# Patient Record
Sex: FEMALE
[De-identification: names, ages, dates, MRNs, and addresses within clinical notes are randomized per-mention and may not be internally consistent; named-entity substitution may affect disease eponyms.]

---

## 2017-10-30 ENCOUNTER — FORM ENCOUNTER (OUTPATIENT)
Age: 57
End: 2017-10-30

## 2019-03-18 ENCOUNTER — FORM ENCOUNTER (OUTPATIENT)
Age: 59
End: 2019-03-18

## 2022-01-14 ENCOUNTER — NON-APPOINTMENT (OUTPATIENT)
Age: 62
End: 2022-01-14

## 2022-01-14 PROBLEM — Z00.00 ENCOUNTER FOR PREVENTIVE HEALTH EXAMINATION: Status: ACTIVE | Noted: 2022-01-14

## 2022-01-20 ENCOUNTER — APPOINTMENT (OUTPATIENT)
Dept: BREAST CENTER | Facility: CLINIC | Age: 62
End: 2022-01-20
Payer: COMMERCIAL

## 2022-01-20 ENCOUNTER — NON-APPOINTMENT (OUTPATIENT)
Age: 62
End: 2022-01-20

## 2022-01-20 VITALS
SYSTOLIC BLOOD PRESSURE: 121 MMHG | WEIGHT: 157 LBS | BODY MASS INDEX: 23.79 KG/M2 | HEIGHT: 68 IN | HEART RATE: 51 BPM | TEMPERATURE: 97.6 F | DIASTOLIC BLOOD PRESSURE: 74 MMHG | OXYGEN SATURATION: 100 %

## 2022-01-20 DIAGNOSIS — R92.2 INCONCLUSIVE MAMMOGRAM: ICD-10-CM

## 2022-01-20 DIAGNOSIS — Z78.9 OTHER SPECIFIED HEALTH STATUS: ICD-10-CM

## 2022-01-20 PROCEDURE — 99213 OFFICE O/P EST LOW 20 MIN: CPT

## 2022-01-20 RX ORDER — ESTRADIOL 1.25 MG/1.25G
GEL TOPICAL
Refills: 0 | Status: ACTIVE | COMMUNITY

## 2022-01-20 NOTE — PHYSICAL EXAM
[Normocephalic] : normocephalic [Examined in the supine and seated position] : examined in the supine and seated position [Symmetrical] : symmetrical [No dominant masses] : no dominant masses in right breast  [No dominant masses] : no dominant masses left breast [No Nipple Retraction] : no left nipple retraction [No Nipple Discharge] : no left nipple discharge [No Axillary Lymphadenopathy] : no left axillary lymphadenopathy

## 2022-01-30 NOTE — ASSESSMENT
[FreeTextEntry1] : Patient presents with is a strong family history of breast cancer, lifetime GREGG risk of 35.5%. Discussed with the patient the implications for her lifetime risk, which is considered to be at high risk to develop breast cancer over the span of her lifetime and it is recommended that she undergoes high risk screening surveillance to include biannual radiological screening exams with a mammogram and screening MRI. \par \par Discussed recommendation to proceed with breast MRI in July 2022 and can follow up same day. \par \par Advised given her family history and high risk status, and prolonged use of topical estrogen, recommend to have a discussion with OBGYN to possibly discontinue. \par \par Discussed importance and implication of genetic testing in regards to her family history and offspring. Patient would like to go forward with genetic counselor consult.\par \par All patient questions were answered; she verbalized understanding of the information and plan of care.\par

## 2022-01-30 NOTE — HISTORY OF PRESENT ILLNESS
[FreeTextEntry1] : 61 year old female previously under the care of Dr. Charli Castro, last evaluated 3/2019 presents for high risk screening. 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 She has family history of breast cancer, her mother was diagnosed at age 45, no genetic testing. She note she can palpate lymph nodes in her right axillary/axillary tail region and left "lumpiness" in the UOQ states she suspects it is scar tissue from her surgery.  Denies skin changes/dimpling, no nipple discharge bilaterally. \par \par GREGG lifetime risk of 35.5%\par \par She is on topical HRT, Divigel (1mg of Estradiol) (has been on for at least 10 years) applies to thigh. \par \par Of note, patient has a thoracic aneurysm, undergoing surveillance.

## 2022-01-30 NOTE — REASON FOR VISIT
[Consultation] : a consultation visit [FreeTextEntry1] : high risk screening, history of left ADH, family history of breast cancer, GREGG of 35.5%

## 2022-01-30 NOTE — DATA REVIEWED
[FreeTextEntry1] : 3/19/2019 ([facility]) bilateral screening mammogram showing breasts are heterogeneously dense, numerous bilateral cysts. No mammographic, tomographic or sonographic evidence of malignancy.\par FOLLOW-UP: Follow-up imaging in 12 months. Recommend annual clinical breast examination, and mammography, sooner if clinically indicated. ASSESSMENT: BI-RADS Category 2: Benign.\par \par 1/20/2022 ([facility]) bilateral mammogram/US showing breasts are heterogeneously dense, Right breast, 3:00 axis, 6 cm from the nipple, scar identified, Right breast, 10:00, 5 cm from the nipple, stable 6 mm benign-appearing lymph node, Right axilla, area of palpable concern, normal appearing normal-sized lymph node with a normal cortex. multiple bilateral waxing and waning simple and minimally complicated cysts.No mammographic or ultrasonographic evidence of malignancy. Benign BIRADS 2 \par

## 2022-01-30 NOTE — PAST MEDICAL HISTORY
[Menarche Age ____] : age at menarche was [unfilled] [Definite ___ (Date)] : the last menstrual period was [unfilled] [Total Preg ___] : G[unfilled] [Abortions ___] : Abortions:[unfilled] [Menopause Age____] : age at menopause was [unfilled] [Living ___] : Living: [unfilled] [AB Spont ___] : miscarriages: [unfilled]  [FreeTextEntry5] : hysterectomy (uterine fibroids) in 2006 ovaries retained  [FreeTextEntry7] : none [FreeTextEntry8] : none

## 2022-07-12 ENCOUNTER — APPOINTMENT (OUTPATIENT)
Dept: BREAST CENTER | Facility: CLINIC | Age: 62
End: 2022-07-12

## 2022-07-12 VITALS
SYSTOLIC BLOOD PRESSURE: 129 MMHG | BODY MASS INDEX: 23.51 KG/M2 | DIASTOLIC BLOOD PRESSURE: 81 MMHG | HEIGHT: 68 IN | HEART RATE: 66 BPM | WEIGHT: 155.13 LBS

## 2022-07-12 DIAGNOSIS — Z78.9 OTHER SPECIFIED HEALTH STATUS: ICD-10-CM

## 2022-07-12 PROCEDURE — 99213 OFFICE O/P EST LOW 20 MIN: CPT

## 2022-07-12 RX ORDER — PHENAZOPYRIDINE HYDROCHLORIDE 200 MG/1
200 TABLET ORAL
Qty: 1 | Refills: 0 | Status: ACTIVE | COMMUNITY
Start: 2022-07-06

## 2022-07-12 RX ORDER — NIRMATRELVIR AND RITONAVIR 300-100 MG
20 X 150 MG & KIT ORAL
Qty: 30 | Refills: 0 | Status: ACTIVE | COMMUNITY
Start: 2022-05-11

## 2022-07-15 NOTE — DATA REVIEWED
[FreeTextEntry1] : 3/19/2019 (R) bilateral screening mammogram showing breasts are heterogeneously dense, numerous bilateral cysts. No mammographic, tomographic or sonographic evidence of malignancy.\par FOLLOW-UP: Follow-up imaging in 12 months. Recommend annual clinical breast examination, and mammography, sooner if clinically indicated. ASSESSMENT: BI-RADS Category 2: Benign.\par \par 1/20/2022 ([facility]) bilateral mammogram/US showing breasts are heterogeneously dense, Right breast, 3:00 axis, 6 cm from the nipple, scar identified, Right breast, 10:00, 5 cm from the nipple, stable 6 mm benign-appearing lymph node, Right axilla, area of palpable concern, normal appearing normal-sized lymph node with a normal cortex. multiple bilateral waxing and waning simple and minimally complicated cysts.No mammographic or ultrasonographic evidence of malignancy. Benign BIRADS 2 \par \par 7/12/22 ([facility]) B/L MRI: No MR evidence of malignancy. [id number]: Benign \par \par

## 2022-07-15 NOTE — HISTORY OF PRESENT ILLNESS
[FreeTextEntry1] : 61 year old female presents for 6 month follow up for high risk screening. Of note, patient previously followed by Dr. Castro. Patient has a history of left ADH s/p left breast excisional biopsy in 2006. She has family history of breast cancer, her mother was diagnosed at age 45, no genetic testing. Denies family history of ovarian cancer. B/L breast MRI performed today, BIRADS-2. \par \par Patient was referred to genetic counselor at [facility] 1/20/22. Patient decided not to follow through, patient unsure if she is a candidate for genetic testing.  \par \par She note she can palpate lymph nodes in her right axillary/axillary tail region and left "lumpiness" in the UOQ states she suspects it is scar tissue from her surgery.  Denies skin changes/dimpling, no nipple discharge bilaterally. \par \par She is on topical HRT, Divigel (1mg of Estradiol) (has been on for at least 10 years) applies to thigh but is weaning off of it now taking once every 5 days as opposed to daily. At [facility] 1/20/22, advised patient to have a discussion with OBGYN to possibly discontinue given patient's high risk factors, considering switch to synthetic po Evista. Of note, patient has a thoracic aneurysm, undergoing surveillance. \par \par Patient has had partial hysterectomy and now plans to have have ovarian cyst removed along with ovary and fallopian tube removal in August 2022. \par \par GREGG lifetime risk of 35.5%

## 2022-07-15 NOTE — ASSESSMENT
[FreeTextEntry1] : 61 year old female presents for 6 month follow up for high risk screening. Patient has a history of left ADH s/p left breast excisional biopsy in 2006. She has family history of breast cancer, her mother was diagnosed at age 45, no genetic testing. Patient is weaning off of topical HRT. Discussed recommendation for use of po Evista given osteoporosis benefit, also discussed option of Tamoxifen. \par \par Patient was referred to genetic counselor at [facility] 1/20/22. Discussed plan for mother to have genetic testing performed. \par \par Patient without complaint. Patient with difficult physical    dense breast tissue bilaterally. Most recent imaging reviewed, B/L MRI 7/12/22, BIRADS-2. \par \par Lifetime GREGG risk of 35.5%. Discussed with the patient the implications for her lifetime risk, which is considered to be at high risk to develop breast cancer over the span of her lifetime and it is recommended that she undergoes high risk screening surveillance to include biannual radiological screening exams with a mammogram and screening MRI. Plan for B/L sMMG/US and re-examination in January 2023. Patient verbalizes understanding and is in agreement with the plan.\par \par \par

## 2022-07-15 NOTE — PHYSICAL EXAM
[Supple] : supple [No Supraclavicular Adenopathy] : no supraclavicular adenopathy [Examined in the supine and seated position] : examined in the supine and seated position [Symmetrical] : symmetrical [No dominant masses] : no dominant masses in right breast  [No dominant masses] : no dominant masses left breast [No Nipple Retraction] : no left nipple retraction [No Nipple Discharge] : no left nipple discharge [No Axillary Lymphadenopathy] : no left axillary lymphadenopathy [de-identified] : dense breast tissue bilaterally

## 2022-07-15 NOTE — REASON FOR VISIT
[Follow-Up: _____] : a [unfilled] follow-up visit [FreeTextEntry1] : high risk screening, history of left ADH, family history of breast cancer, GREGG of 35.5%

## 2022-07-15 NOTE — PAST MEDICAL HISTORY
[Menarche Age ____] : age at menarche was [unfilled] [Menopause Age____] : age at menopause was [unfilled] [Definite ___ (Date)] : the last menstrual period was [unfilled] [Total Preg ___] : G[unfilled] [Abortions ___] : Abortions:[unfilled] [Living ___] : Living: [unfilled] [AB Spont ___] : miscarriages: [unfilled]  [History of Hormone Replacement Treatment] : has a history of hormone replacement treatment [FreeTextEntry5] : hysterectomy (uterine fibroids) in 2006 ovaries retained  [FreeTextEntry6] : yes [FreeTextEntry7] : none [FreeTextEntry8] : none

## 2023-12-20 ENCOUNTER — NON-APPOINTMENT (OUTPATIENT)
Age: 63
End: 2023-12-20

## 2023-12-26 ENCOUNTER — APPOINTMENT (OUTPATIENT)
Dept: BREAST CENTER | Facility: CLINIC | Age: 63
End: 2023-12-26

## 2024-01-23 ENCOUNTER — APPOINTMENT (OUTPATIENT)
Dept: HEMATOLOGY ONCOLOGY | Facility: CLINIC | Age: 64
End: 2024-01-23

## 2024-01-23 ENCOUNTER — APPOINTMENT (OUTPATIENT)
Dept: BREAST CENTER | Facility: CLINIC | Age: 64
End: 2024-01-23
Payer: COMMERCIAL

## 2024-01-23 VITALS
SYSTOLIC BLOOD PRESSURE: 115 MMHG | WEIGHT: 147 LBS | DIASTOLIC BLOOD PRESSURE: 66 MMHG | HEIGHT: 68 IN | HEART RATE: 54 BPM | BODY MASS INDEX: 22.28 KG/M2

## 2024-01-23 DIAGNOSIS — R73.03 PREDIABETES.: ICD-10-CM

## 2024-01-23 DIAGNOSIS — Z91.89 OTHER SPECIFIED PERSONAL RISK FACTORS, NOT ELSEWHERE CLASSIFIED: ICD-10-CM

## 2024-01-23 DIAGNOSIS — Z80.3 FAMILY HISTORY OF MALIGNANT NEOPLASM OF BREAST: ICD-10-CM

## 2024-01-23 DIAGNOSIS — Z87.42 PERSONAL HISTORY OF OTHER DISEASES OF THE FEMALE GENITAL TRACT: ICD-10-CM

## 2024-01-23 PROCEDURE — 99213 OFFICE O/P EST LOW 20 MIN: CPT

## 2024-01-25 NOTE — DATA REVIEWED
[FreeTextEntry1] : 3/19/2019 ([facility]) bilateral screening mammogram showing breasts are heterogeneously dense, numerous bilateral cysts. No mammographic, tomographic or sonographic evidence of malignancy. FOLLOW-UP: Follow-up imaging in 12 months. Recommend annual clinical breast examination, and mammography, sooner if clinically indicated. ASSESSMENT: BI-RADS Category 2: Benign.  1/20/2022 ([facility]) bilateral mammogram/US showing breasts are heterogeneously dense, Right breast, 3:00 axis, 6 cm from the nipple, scar identified, Right breast, 10:00, 5 cm from the nipple, stable 6 mm benign-appearing lymph node, Right axilla, area of palpable concern, normal appearing normal-sized lymph node with a normal cortex. multiple bilateral waxing and waning simple and minimally complicated cysts.No mammographic or ultrasonographic evidence of malignancy. Benign BIRADS 2   7/12/22 ([facility]) B/L MRI: No MR evidence of malignancy. [id number]: Benign   1/23/24 B/L sMMG/US ([facility])- heterogeneously dense.  Benign findings. No mammographic or ultrasonographic evidence of malignancy. No significant change. FOLLOW-UP: Annual screening. Patient appears to be due for high risk screening breast MRI at this time. BI-RADS 2:  Benign.

## 2024-01-25 NOTE — PAST MEDICAL HISTORY
[FreeTextEntry5] : hysterectomy (uterine fibroids) in 2006 ovaries retained  [FreeTextEntry6] : yes [FreeTextEntry7] : none [FreeTextEntry8] : none

## 2024-01-25 NOTE — ASSESSMENT
[FreeTextEntry1] : 63 year old female presents for annual follow up for high risk screening. Patient has a history of left ADH s/p left breast excisional biopsy in 2006. She has family history of breast cancer, her mother was diagnosed at age 45, no genetic testing. Patient is weaning off of topical HRT. Discussed recommendation for use of po Evista given osteoporosis benefit, also discussed option of Tamoxifen.  Discussed importance and implication of genetic testing in regards to her family history and offspring. Patient would like to go forward with genetic counselor consult.  Most recent imaging reviewed, B/L sMMG/US 1/23/24 BIRADS-2. Patient is overdue for breast MRI imaging.   Lifetime GREGG risk of 35.5%. Discussed imaging findings. Plan for B/L high risk MRI and re-examination in July 2024. Patient verbalizes understanding and agrees with the plan.

## 2024-01-25 NOTE — REASON FOR VISIT
[FreeTextEntry1] : high risk screening, history of left ADH, family history of breast cancer, GREGG of 35.5%

## 2024-01-25 NOTE — HISTORY OF PRESENT ILLNESS
[FreeTextEntry1] : 63 year old female presents for annual follow up for high risk screening. Of note, patient previously followed by Dr. Castro. Patient has a history of left ADH s/p left breast excisional biopsy in 2006. She has family history of breast cancer, her mother was diagnosed at age 45, no genetic testing. Denies family history of ovarian cancer.   B/L MRI  7/2022 BIRADS 2, patient overdue for breast MRI imaging. B/L sMMG/US 1/23/24 BIRADS-2  Patient was referred to genetic counselor at [facility] 1/20/22. Patient decided not to follow through, patient unsure if she is a candidate for genetic testing.    She note she can palpate lymph nodes in her right axillary/axillary tail region and left "lumpiness" in the UOQ states she suspects it is scar tissue from her surgery.  Denies skin changes/dimpling, no nipple discharge bilaterally.   She is on topical HRT, Divigel (1mg of Estradiol) (has been on for at least 10 years) applies to thigh but is weaning off of it now taking once every 5 days as opposed to daily. At [facility] 1/20/22, advised patient to have a discussion with OBGYN to possibly discontinue given patient's high risk factors, considering switch to synthetic po Evista. Of note, patient has a thoracic aneurysm, undergoing surveillance.   Patient has had partial hysterectomy and now plans to have have ovarian cyst removed along with ovary and fallopian tube removal in August 2022.   GREGG lifetime risk of 35.5%

## 2024-01-30 NOTE — DISCUSSION/SUMMARY
[FreeTextEntry1] : REASON FOR CONSULT: Rolanda Qiu is a 63-year-old female referred by Dr. Charli Aquino for cancer genetic counseling and risk assessment due to a family history of cancer. Ms. Qiu was seen on 2024 at which time medical and family history was ascertained and a pedigree constructed. Genetic counseling , Shayla Tsang, also participated in this session.   RELEVANT MEDICAL HISTORY: Ms. Qiu is a healthy individual with no reported history of cancer. She has a family history of cancer, see below. She does have a history of left breast atypical ductal hyperplasia that was excised in .  OTHER MEDICAL AND SURGICAL HISTORY: -	Medical History: no significant medical history reported -	Surgical History: hysterectomy  d/t fibroids, bilateral salpingo-oophorectomy  d/t cysts  OB/GYN HISTORY: Obstetrical History:  (has 2 adopted children) Age at Menarche: 10 Menopausal Status: Post-menopausal with LMP at age 50 d/t hysterectomy Age at First Live Birth: N/A Oral Contraceptive Use: yes, 3 years Hormone Replacement Therapy: Yes, vaginal estradiol, currently weaning off   CANCER SCREENING HISTORY:   Breast:  -	Mammography: 24- wnl -	Sonography: 24- wnl -	MRI: 22- wnl -	Biopsies: yes, - ADH GYN: -	Pelvic Examination: Annual- reportedly wnl, s/p MURALI/BSO Colon: -	Colonoscopy: - reported no history of polyps, repeating every 7 years Skin:   -	FBSE: Yes -	Lesions biopsied/removed: yes- sebaceous cyst  SOCIAL HISTORY: -	Tobacco-product use: Yes, former- 10 years -	Environmental exposures: No   FAMILY HISTORY: Maternal ancestry was reported as Frisian/Pashto and paternal ancestry was reported as Pashto/Sudanese. Ashkenazi Roman Catholic ancestry was denied. A detailed family history of cancer was ascertained, see below and scanned chart for pedigree.   According to Ms. Qiu no one in the family has had germline testing for cancer susceptibility. Consanguinity was denied.  	 RISK ASSESSMENT: Ms. Qiu's personal and family history is suggestive of a hereditary cancer syndrome given her mother's history of breast cancer at age 45 and maternal grandfather with a metastatic cancer of either stomach or colon primary in his 50s. The patient meets National Comprehensive Cancer Network (NCCN) criteria for genetic testing. We recommended genetic testing using [facility] test. This test analyzes 48 genes: DAYNE, APC, AXIN2, BAP1, BARD1, BMPR1A, BRCA1, BRCA2, BRIP1, CDH1, CDK4, CDKN2A, CHEK2, CTNNA1, EGFR, EPCAM, FH, FLCN, GREM1, HOXB13, MEN1, MET, MITF, MLH1, MSH2, MSH3, MSH6, MUTYH, NF1, NTLH1, PALB2, POLD1, POLE, PMS2, PTEN, RAD51C, RAD51D, RET, SDHA, SDHB, SDHC, SDHD SMAD4, STK11, TERT, TSC1, TSC2, TP53 and VHL.  The risks, benefits and limitations of genetic testing were discussed with Ms. Qiu. In addition, we discussed the purpose of genetic testing and possible test results (positive, negative, inconclusive) along with associated medical management options and psychosocial implications. Insurance coverage and potential out of pocket costs were also discussed. The Genetic Information Non-discrimination Act (TREASURE) was reviewed.  It was explained that risk assessment is based upon medical and family history as provided and may change in the future should new information be obtained.   Following our discussion, Ms. Qiu consented to the above-mentioned genetic testing panel. Blood was drawn in our laboratory and sent to [facility] today.  PLAN:  1.	Blood drawn today will be sent to [facility] for analysis.  2.	We will contact Ms. Qiu to schedule a follow-up appointment once the results are available. Results generally return in 2-3 weeks.   For any additional questions please call Cancer Genetics at [phone].    Sintia Wolfe MS, [facility] [place] Genetic Counselor, Cancer Genetics

## 2024-02-01 ENCOUNTER — NON-APPOINTMENT (OUTPATIENT)
Age: 64
End: 2024-02-01

## 2024-02-01 NOTE — DISCUSSION/SUMMARY
[FreeTextEntry1] : RESULTS TRANSMISSION:   Rolanda Qiu is a 63-year-old female who was contacted on February 1, 2024 for a discussion regarding her negative genetic testing results related to hereditary cancer predisposition. This session was conducted via telephone.   Ms. Qiu was originally seen by the Cancer Genetics Service on January 23, 2024 for hereditary cancer predisposition risk assessment due to a family history of cancer. At that time, Ms. Qiu decided to pursue genetic testing using [facility] panel.   TEST RESULTS: NEGATIVE NO pathogenic (disease-causing) variants or variants of uncertain significance were detected in any of the following genes [48]:  DAYNE, APC, AXIN2, BAP1, BARD1, BMPR1A, BRCA1, BRCA2, BRIP1, CDH1, CDK4, CDKN2A, CHEK2, CTNNA1, EGFR, EPCAM, FH, FLCN, GREM1, HOXB13, MEN1, MET, MITF, MLH1, MSH2, MSH3, MSH6, MUTYH, NF1, NTLH1, PALB2, POLD1, POLE, PMS2, PTEN, RAD51C, RAD51D, RET, SDHA, SDHB, SDHC, SDHD SMAD4, STK11, TERT, TSC1, TSC2, TP53 and VHL.  RESULTS INTERPRETATION AND ASSESSMENT: Given Ms. Qiu's personal and current reported family history of cancer, and her negative genetic test results, the following screening guidelines and risk-reducing recommendations were discussed:  BREAST:  -	Given patient's history of atypical ductal hyperplasia and family history of breast cancer, we recommended she continue screening as per her discussions with her breast care team.   OTHER: -	In the absence of other indications, Ms. Qiu should practice age-appropriate cancer screening of other organ systems as recommended for the general population.  We also discussed that, while no cause of the patient's personal and family history of cancer was identified, this result, while reassuring, does entirely not rule out a hereditary cancer risk in the patient. It is possible, although unlikely, the patient has a mutation in one of the genes tested that is not detectable by this analysis, or has a mutation in a different gene, either known or unknown. It is also possible there is a hereditary cancer predisposition in the family, but the patient did not inherit it.   We informed Ms. Qiu that our knowledge of genetics and inherited cancer conditions is changing rapidly. Therefore, we recommended that Ms. Qiu contact our office, every 2 to 3 years, to discuss relevant advances in cancer genetics.  We emphasized the importance of re-contacting us with updates regarding her personal and family history of cancer as well as any updates regarding additional cancer genetic test results performed for the patient and/or family members.  Such updates could possibly change our risk assessment and recommendations.   PLAN: 1.	These results do not change Ms. Qiu's medical management.  2.	Patient informed consult note(s) will be available through their [facility] patient portal and genetic test results will be mailed to patient. 3.	Ms. Qiu was encouraged to contact us every 2-3 years to discuss relevant advances in cancer genetics, or sooner if there are any changes in her personal or family history of cancer.   For any additional questions please call Cancer Genetics at [phone].    Sintia Wolfe MS, [facility] [place] Genetic Counselor, Cancer Genetics

## 2025-01-28 ENCOUNTER — APPOINTMENT (OUTPATIENT)
Dept: BREAST CENTER | Facility: CLINIC | Age: 65
End: 2025-01-28

## 2025-01-28 VITALS
BODY MASS INDEX: 23.04 KG/M2 | HEART RATE: 74 BPM | SYSTOLIC BLOOD PRESSURE: 146 MMHG | WEIGHT: 152 LBS | HEIGHT: 68 IN | DIASTOLIC BLOOD PRESSURE: 72 MMHG

## 2025-01-28 DIAGNOSIS — Z80.3 FAMILY HISTORY OF MALIGNANT NEOPLASM OF BREAST: ICD-10-CM

## 2025-01-28 DIAGNOSIS — R92.8 OTHER ABNORMAL AND INCONCLUSIVE FINDINGS ON DIAGNOSTIC IMAGING OF BREAST: ICD-10-CM

## 2025-01-28 DIAGNOSIS — Z91.89 OTHER SPECIFIED PERSONAL RISK FACTORS, NOT ELSEWHERE CLASSIFIED: ICD-10-CM

## 2025-01-28 DIAGNOSIS — Z87.42 PERSONAL HISTORY OF OTHER DISEASES OF THE FEMALE GENITAL TRACT: ICD-10-CM

## 2025-01-28 PROCEDURE — 99213 OFFICE O/P EST LOW 20 MIN: CPT

## 2025-01-28 RX ORDER — METFORMIN HYDROCHLORIDE 625 MG/1
TABLET ORAL
Refills: 0 | Status: ACTIVE | COMMUNITY

## 2025-01-28 RX ORDER — ATORVASTATIN CALCIUM 80 MG/1
TABLET, FILM COATED ORAL
Refills: 0 | Status: ACTIVE | COMMUNITY

## 2025-01-28 RX ORDER — MULTIVITAMIN
TABLET ORAL
Refills: 0 | Status: ACTIVE | COMMUNITY

## 2025-01-28 RX ORDER — ESTRADIOL 10 UG/1
TABLET, FILM COATED VAGINAL
Refills: 0 | Status: ACTIVE | COMMUNITY

## 2025-02-03 ENCOUNTER — RESULT REVIEW (OUTPATIENT)
Age: 65
End: 2025-02-03

## 2025-02-06 ENCOUNTER — NON-APPOINTMENT (OUTPATIENT)
Age: 65
End: 2025-02-06

## 2025-02-06 DIAGNOSIS — Z98.890 OTHER SPECIFIED POSTPROCEDURAL STATES: ICD-10-CM

## 2025-08-05 ENCOUNTER — APPOINTMENT (OUTPATIENT)
Dept: BREAST CENTER | Facility: CLINIC | Age: 65
End: 2025-08-05

## 2025-08-12 ENCOUNTER — APPOINTMENT (OUTPATIENT)
Dept: BREAST CENTER | Facility: CLINIC | Age: 65
End: 2025-08-12